# Patient Record
Sex: FEMALE | Race: WHITE | NOT HISPANIC OR LATINO | URBAN - METROPOLITAN AREA
[De-identification: names, ages, dates, MRNs, and addresses within clinical notes are randomized per-mention and may not be internally consistent; named-entity substitution may affect disease eponyms.]

---

## 2017-02-22 ENCOUNTER — OUTPATIENT (OUTPATIENT)
Dept: OUTPATIENT SERVICES | Facility: HOSPITAL | Age: 67
LOS: 1 days | End: 2017-02-22

## 2018-04-03 ENCOUNTER — OUTPATIENT (OUTPATIENT)
Dept: OUTPATIENT SERVICES | Facility: HOSPITAL | Age: 68
LOS: 1 days | End: 2018-04-03

## 2019-10-04 ENCOUNTER — APPOINTMENT (OUTPATIENT)
Dept: MAMMOGRAPHY | Facility: CLINIC | Age: 69
End: 2019-10-04
Payer: MEDICARE

## 2019-10-04 ENCOUNTER — APPOINTMENT (OUTPATIENT)
Dept: ULTRASOUND IMAGING | Facility: CLINIC | Age: 69
End: 2019-10-04

## 2019-10-04 ENCOUNTER — APPOINTMENT (OUTPATIENT)
Dept: RADIOLOGY | Facility: CLINIC | Age: 69
End: 2019-10-04

## 2019-10-04 PROCEDURE — 77067 SCR MAMMO BI INCL CAD: CPT

## 2019-10-04 PROCEDURE — 77063 BREAST TOMOSYNTHESIS BI: CPT

## 2019-10-04 PROCEDURE — 77080 DXA BONE DENSITY AXIAL: CPT

## 2019-10-04 PROCEDURE — 76641 ULTRASOUND BREAST COMPLETE: CPT | Mod: 50

## 2020-09-15 ENCOUNTER — OUTPATIENT (OUTPATIENT)
Dept: OUTPATIENT SERVICES | Facility: HOSPITAL | Age: 70
LOS: 1 days | End: 2020-09-15

## 2020-11-11 ENCOUNTER — APPOINTMENT (OUTPATIENT)
Dept: ULTRASOUND IMAGING | Facility: CLINIC | Age: 70
End: 2020-11-11

## 2020-11-11 ENCOUNTER — APPOINTMENT (OUTPATIENT)
Dept: MAMMOGRAPHY | Facility: CLINIC | Age: 70
End: 2020-11-11
Payer: MEDICARE

## 2020-11-11 PROCEDURE — 77067 SCR MAMMO BI INCL CAD: CPT

## 2020-11-11 PROCEDURE — 76641 ULTRASOUND BREAST COMPLETE: CPT | Mod: 50

## 2020-11-11 PROCEDURE — 77063 BREAST TOMOSYNTHESIS BI: CPT

## 2021-03-31 ENCOUNTER — OUTPATIENT (OUTPATIENT)
Dept: OUTPATIENT SERVICES | Facility: HOSPITAL | Age: 71
LOS: 1 days | End: 2021-03-31

## 2021-12-29 ENCOUNTER — APPOINTMENT (OUTPATIENT)
Dept: RADIOLOGY | Facility: CLINIC | Age: 71
End: 2021-12-29
Payer: MEDICARE

## 2021-12-29 ENCOUNTER — APPOINTMENT (OUTPATIENT)
Dept: MAMMOGRAPHY | Facility: CLINIC | Age: 71
End: 2021-12-29

## 2021-12-29 PROCEDURE — 77067 SCR MAMMO BI INCL CAD: CPT

## 2021-12-29 PROCEDURE — 77063 BREAST TOMOSYNTHESIS BI: CPT

## 2021-12-29 PROCEDURE — 77080 DXA BONE DENSITY AXIAL: CPT

## 2022-01-07 ENCOUNTER — APPOINTMENT (OUTPATIENT)
Dept: ULTRASOUND IMAGING | Facility: CLINIC | Age: 72
End: 2022-01-07

## 2022-01-11 PROBLEM — Z00.00 ENCOUNTER FOR PREVENTIVE HEALTH EXAMINATION: Status: ACTIVE | Noted: 2022-01-11

## 2022-01-12 ENCOUNTER — APPOINTMENT (OUTPATIENT)
Dept: ULTRASOUND IMAGING | Facility: CLINIC | Age: 72
End: 2022-01-12
Payer: COMMERCIAL

## 2022-01-12 PROCEDURE — 76641 ULTRASOUND BREAST COMPLETE: CPT | Mod: 50

## 2022-05-09 ENCOUNTER — APPOINTMENT (OUTPATIENT)
Dept: PULMONOLOGY | Facility: CLINIC | Age: 72
End: 2022-05-09
Payer: MEDICARE

## 2022-05-09 ENCOUNTER — NON-APPOINTMENT (OUTPATIENT)
Age: 72
End: 2022-05-09

## 2022-05-09 VITALS
HEIGHT: 61 IN | BODY MASS INDEX: 26.06 KG/M2 | WEIGHT: 138 LBS | TEMPERATURE: 97.3 F | HEART RATE: 72 BPM | DIASTOLIC BLOOD PRESSURE: 88 MMHG | SYSTOLIC BLOOD PRESSURE: 165 MMHG | OXYGEN SATURATION: 99 %

## 2022-05-09 PROCEDURE — 99204 OFFICE O/P NEW MOD 45 MIN: CPT

## 2022-05-09 RX ORDER — ROSUVASTATIN CALCIUM 10 MG/1
10 TABLET, FILM COATED ORAL
Qty: 90 | Refills: 0 | Status: ACTIVE | COMMUNITY
Start: 2021-11-11

## 2022-05-09 RX ORDER — OMEPRAZOLE 40 MG/1
40 CAPSULE, DELAYED RELEASE ORAL
Qty: 90 | Refills: 0 | Status: ACTIVE | COMMUNITY
Start: 2022-04-11

## 2022-05-09 RX ORDER — TRIAMCINOLONE ACETONIDE 55 UG/1
55 SOLUTION/ DROPS OPHTHALMIC
Qty: 17 | Refills: 0 | Status: ACTIVE | COMMUNITY
Start: 2021-02-11

## 2022-05-09 NOTE — DISCUSSION/SUMMARY
[FreeTextEntry1] : Ms. Strong had a CT abdomen which revealed some bibasilar nodules.  They are very nonspecific and too small for PET scan or biopsy.  I favor a CT of the chest to define the anatomy in the entirety of the lung fields and then likely a follow-up CAT scan in 6 months to ensure stability in this low risk patient with multiple nodules she has a history of a CAT scan of chest with nodule 7 years ago at [facility] and we will obtain those results..  This been discussed with the patient she understands and agrees.\par The patient understands and agrees with plan of care.\par Today's office visit encompassed 46  minutes. I conducted an extensive history ,physical exam and reviewed diagnosis and treatment options  including diagnostic tests,radiologic studies including cat scans  and the use of prescription medication.

## 2022-05-09 NOTE — HISTORY OF PRESENT ILLNESS
[Never] : never [TextBox_4] : 72 female no hx tobacco \par presents today because of lung nodules seen on ct abdomen\par feels good no sob no cough no wheeze no chest pain no tightness\par no wt loss no hemoptysis\par no hx cancer\par occ [employer] [employer] [facility] school no asbestos no chemical

## 2022-06-13 ENCOUNTER — NON-APPOINTMENT (OUTPATIENT)
Age: 72
End: 2022-06-13

## 2022-09-30 ENCOUNTER — NON-APPOINTMENT (OUTPATIENT)
Age: 72
End: 2022-09-30

## 2022-11-07 ENCOUNTER — APPOINTMENT (OUTPATIENT)
Dept: PULMONOLOGY | Facility: CLINIC | Age: 72
End: 2022-11-07

## 2022-11-07 VITALS
OXYGEN SATURATION: 98 % | TEMPERATURE: 98 F | BODY MASS INDEX: 26.06 KG/M2 | SYSTOLIC BLOOD PRESSURE: 170 MMHG | WEIGHT: 138 LBS | HEART RATE: 78 BPM | DIASTOLIC BLOOD PRESSURE: 105 MMHG | HEIGHT: 61 IN

## 2022-11-07 DIAGNOSIS — R91.8 OTHER NONSPECIFIC ABNORMAL FINDING OF LUNG FIELD: ICD-10-CM

## 2022-11-07 PROCEDURE — 99214 OFFICE O/P EST MOD 30 MIN: CPT

## 2022-11-07 RX ORDER — OLOPATADINE HCL 1 MG/ML
0.1 SOLUTION/ DROPS OPHTHALMIC
Qty: 5 | Refills: 0 | Status: ACTIVE | COMMUNITY
Start: 2022-10-05

## 2022-11-07 RX ORDER — ALPRAZOLAM 1 MG/1
1 TABLET ORAL
Qty: 2 | Refills: 0 | Status: ACTIVE | COMMUNITY
Start: 2022-10-12

## 2022-11-07 RX ORDER — UBIDECARENONE 200 MG
CAPSULE ORAL
Refills: 0 | Status: ACTIVE | COMMUNITY

## 2022-11-07 RX ORDER — VALACYCLOVIR 1 G/1
1 TABLET, FILM COATED ORAL
Qty: 4 | Refills: 0 | Status: DISCONTINUED | COMMUNITY
Start: 2021-11-11 | End: 2022-11-07

## 2022-11-07 NOTE — DISCUSSION/SUMMARY
[FreeTextEntry1] : Ms Strong  denies all pulmonary symptoms.  Her CAT scan of chest did not reveal any nodules and only one granuloma.  In a low risk patient I do not think further CAT scans are warranted.  The patient understands and agrees with this plan of care.\par The patient understands and agrees with plan of care.\par Today's office visit encompassed 32 minutes. I conducted an extensive history ,physical exam and reviewed diagnosis and treatment options  including diagnostic tests,radiologic studies including  cat scans  and the use of prescription medication.

## 2022-11-07 NOTE — HISTORY OF PRESENT ILLNESS
[Never] : never [TextBox_4] : 72 female no hx tobacco for fu re nodules\par feels good  no sob no cough no chest pain no tightness\par no hemoptysis no wt loss \par no hx cancer

## 2022-11-07 NOTE — REASON FOR VISIT
[Follow-Up] : a follow-up visit [Pulmonary Nodules] : pulmonary nodules [TextBox_44] : 6 months. CT completed. Pt has no pulmonary complaints.

## 2022-12-07 ENCOUNTER — NON-APPOINTMENT (OUTPATIENT)
Age: 72
End: 2022-12-07

## 2023-01-04 ENCOUNTER — NON-APPOINTMENT (OUTPATIENT)
Age: 73
End: 2023-01-04

## 2023-01-18 ENCOUNTER — APPOINTMENT (OUTPATIENT)
Dept: MAMMOGRAPHY | Facility: CLINIC | Age: 73
End: 2023-01-18
Payer: MEDICARE

## 2023-01-18 ENCOUNTER — APPOINTMENT (OUTPATIENT)
Dept: ULTRASOUND IMAGING | Facility: CLINIC | Age: 73
End: 2023-01-18

## 2023-01-18 PROCEDURE — 77063 BREAST TOMOSYNTHESIS BI: CPT

## 2023-01-18 PROCEDURE — 77067 SCR MAMMO BI INCL CAD: CPT

## 2023-01-18 PROCEDURE — 76641 ULTRASOUND BREAST COMPLETE: CPT | Mod: 50

## 2024-01-26 ENCOUNTER — APPOINTMENT (OUTPATIENT)
Dept: RADIOLOGY | Facility: CLINIC | Age: 74
End: 2024-01-26
Payer: MEDICARE

## 2024-01-26 ENCOUNTER — APPOINTMENT (OUTPATIENT)
Dept: MAMMOGRAPHY | Facility: CLINIC | Age: 74
End: 2024-01-26
Payer: MEDICARE

## 2024-01-26 ENCOUNTER — APPOINTMENT (OUTPATIENT)
Dept: RADIOLOGY | Facility: CLINIC | Age: 74
End: 2024-01-26

## 2024-01-26 ENCOUNTER — APPOINTMENT (OUTPATIENT)
Dept: MAMMOGRAPHY | Facility: CLINIC | Age: 74
End: 2024-01-26

## 2024-01-26 PROCEDURE — 77067 SCR MAMMO BI INCL CAD: CPT

## 2024-01-26 PROCEDURE — 77080 DXA BONE DENSITY AXIAL: CPT

## 2024-01-26 PROCEDURE — 77063 BREAST TOMOSYNTHESIS BI: CPT

## 2024-02-29 ENCOUNTER — OFFICE (OUTPATIENT)
Dept: URBAN - METROPOLITAN AREA CLINIC 105 | Facility: CLINIC | Age: 74
Setting detail: OPHTHALMOLOGY
End: 2024-02-29
Payer: COMMERCIAL

## 2024-02-29 VITALS — HEIGHT: 55 IN

## 2024-02-29 DIAGNOSIS — H52.7: ICD-10-CM

## 2024-02-29 DIAGNOSIS — H40.013: ICD-10-CM

## 2024-02-29 DIAGNOSIS — H25.13: ICD-10-CM

## 2024-02-29 PROCEDURE — 92250 FUNDUS PHOTOGRAPHY W/I&R: CPT | Performed by: STUDENT IN AN ORGANIZED HEALTH CARE EDUCATION/TRAINING PROGRAM

## 2024-02-29 PROCEDURE — 92015 DETERMINE REFRACTIVE STATE: CPT | Performed by: STUDENT IN AN ORGANIZED HEALTH CARE EDUCATION/TRAINING PROGRAM

## 2024-02-29 PROCEDURE — 92014 COMPRE OPH EXAM EST PT 1/>: CPT | Performed by: STUDENT IN AN ORGANIZED HEALTH CARE EDUCATION/TRAINING PROGRAM

## 2024-02-29 ASSESSMENT — SPHEQUIV_DERIVED
OS_SPHEQUIV: 0.125
OS_SPHEQUIV: 0.75
OD_SPHEQUIV: 0.375

## 2024-02-29 ASSESSMENT — REFRACTION_MANIFEST
OS_SPHERE: +0.75
OD_CYLINDER: -1.25
OD_SPHERE: PLANO
OS_ADD: +2.50
OD_VA1: 20/25-
OD_AXIS: 070
OS_CYLINDER: -1.25
OS_VA2: 20/20
OS_AXIS: 109
OS_VA1: 20/20
OD_CYLINDER: -1.75
OS_ADD: +3.00
OS_AXIS: 105
OD_AXIS: 077
OS_VA1: 20/25
OD_ADD: +3.00
OS_SPHERE: +1.25
OS_CYLINDER: -1.00
OD_SPHERE: +1.00
OD_VA2: 20/20
OD_ADD: +2.50
OD_VA1: 20/25

## 2024-02-29 ASSESSMENT — REFRACTION_CURRENTRX
OD_OVR_VA: 20/
OD_SPHERE: +2.00
OS_ADD: +2.25
OS_SPHERE: +2.00
OS_AXIS: 107
OS_OVR_VA: 20/
OD_CYLINDER: -2.00
OD_AXIS: 058
OS_CYLINDER: -1.00
OS_VPRISM_DIRECTION: PROGS
OD_ADD: +2.25
OD_VPRISM_DIRECTION: PROGS

## 2024-02-29 ASSESSMENT — CONFRONTATIONAL VISUAL FIELD TEST (CVF)
OS_FINDINGS: FULL
OD_FINDINGS: FULL

## 2024-10-02 ENCOUNTER — OFFICE (OUTPATIENT)
Dept: URBAN - METROPOLITAN AREA CLINIC 105 | Facility: CLINIC | Age: 74
Setting detail: OPHTHALMOLOGY
End: 2024-10-02
Payer: COMMERCIAL

## 2024-10-02 DIAGNOSIS — H40.013: ICD-10-CM

## 2024-10-02 PROCEDURE — 99213 OFFICE O/P EST LOW 20 MIN: CPT | Performed by: STUDENT IN AN ORGANIZED HEALTH CARE EDUCATION/TRAINING PROGRAM

## 2024-10-02 PROCEDURE — 92083 EXTENDED VISUAL FIELD XM: CPT | Performed by: STUDENT IN AN ORGANIZED HEALTH CARE EDUCATION/TRAINING PROGRAM

## 2024-10-02 PROCEDURE — 92133 CPTRZD OPH DX IMG PST SGM ON: CPT | Performed by: STUDENT IN AN ORGANIZED HEALTH CARE EDUCATION/TRAINING PROGRAM

## 2024-10-02 ASSESSMENT — REFRACTION_MANIFEST
OD_AXIS: 070
OS_AXIS: 109
OD_SPHERE: PLANO
OD_VA1: 20/25
OD_VA1: 20/25-
OD_CYLINDER: -1.25
OS_CYLINDER: -1.00
OS_ADD: +2.50
OS_SPHERE: +1.25
OS_VA2: 20/20
OD_AXIS: 077
OS_VA1: 20/25
OD_SPHERE: +1.00
OS_VA1: 20/20
OD_VA2: 20/20
OS_AXIS: 105
OD_CYLINDER: -1.75
OD_ADD: +3.00
OS_SPHERE: +0.75
OD_ADD: +2.50
OS_CYLINDER: -1.25
OS_ADD: +3.00

## 2024-10-02 ASSESSMENT — KERATOMETRY
OD_K2POWER_DIOPTERS: 42.50
OS_K1POWER_DIOPTERS: 42.75
OD_AXISANGLE_DEGREES: 171
OS_AXISANGLE_DEGREES: 059
OS_K2POWER_DIOPTERS: 43.00
OD_K1POWER_DIOPTERS: 41.75

## 2024-10-02 ASSESSMENT — REFRACTION_CURRENTRX
OS_CYLINDER: -2.00
OD_OVR_VA: 20/
OS_VPRISM_DIRECTION: PROGS
OD_VPRISM_DIRECTION: PROGS
OS_ADD: +2.50
OD_SPHERE: -0.25
OS_AXIS: 122
OD_CYLINDER: -1.50
OD_ADD: +2.50
OS_OVR_VA: 20/
OD_AXIS: 080
OS_SPHERE: +1.75

## 2024-10-02 ASSESSMENT — TONOMETRY
OD_IOP_MMHG: 21
OS_IOP_MMHG: 20

## 2024-10-02 ASSESSMENT — REFRACTION_AUTOREFRACTION
OS_CYLINDER: -1.25
OD_AXIS: 081
OD_CYLINDER: -1.75
OD_SPHERE: -0.25
OS_SPHERE: 0.00
OS_AXIS: 102

## 2024-10-02 ASSESSMENT — PACHYMETRY
OD_CT_UM: 570
OD_CT_CORRECTION: -2
OS_CT_UM: 560
OS_CT_CORRECTION: -1

## 2024-10-02 ASSESSMENT — CONFRONTATIONAL VISUAL FIELD TEST (CVF)
OS_FINDINGS: FULL
OD_FINDINGS: FULL

## 2024-10-02 ASSESSMENT — VISUAL ACUITY
OS_BCVA: 20/30-
OD_BCVA: 20/40-2

## 2025-01-27 ENCOUNTER — APPOINTMENT (OUTPATIENT)
Dept: MAMMOGRAPHY | Facility: CLINIC | Age: 75
End: 2025-01-27
Payer: MEDICARE

## 2025-01-27 PROCEDURE — 77067 SCR MAMMO BI INCL CAD: CPT

## 2025-01-27 PROCEDURE — 77063 BREAST TOMOSYNTHESIS BI: CPT

## 2025-04-02 ENCOUNTER — OFFICE (OUTPATIENT)
Dept: URBAN - METROPOLITAN AREA CLINIC 105 | Facility: CLINIC | Age: 75
Setting detail: OPHTHALMOLOGY
End: 2025-04-02
Payer: COMMERCIAL

## 2025-04-02 DIAGNOSIS — Z83.518: ICD-10-CM

## 2025-04-02 DIAGNOSIS — H25.13: ICD-10-CM

## 2025-04-02 DIAGNOSIS — H40.013: ICD-10-CM

## 2025-04-02 PROCEDURE — 92014 COMPRE OPH EXAM EST PT 1/>: CPT | Performed by: STUDENT IN AN ORGANIZED HEALTH CARE EDUCATION/TRAINING PROGRAM

## 2025-04-02 PROCEDURE — 92250 FUNDUS PHOTOGRAPHY W/I&R: CPT | Performed by: STUDENT IN AN ORGANIZED HEALTH CARE EDUCATION/TRAINING PROGRAM

## 2025-04-02 ASSESSMENT — REFRACTION_MANIFEST
OS_VA1: 20/20
OS_CYLINDER: -1.00
OD_CYLINDER: -1.25
OS_VA2: 20/20
OS_AXIS: 109
OD_SPHERE: PLANO
OD_AXIS: 077
OS_AXIS: 105
OS_VA1: 20/25
OD_SPHERE: +1.00
OD_VA1: 20/25-
OD_VA1: 20/25
OS_ADD: +3.00
OS_ADD: +2.50
OD_ADD: +2.50
OD_CYLINDER: -1.75
OS_SPHERE: +1.25
OD_VA2: 20/20
OS_CYLINDER: -1.25
OD_AXIS: 070
OS_SPHERE: +0.75
OD_ADD: +3.00

## 2025-04-02 ASSESSMENT — REFRACTION_CURRENTRX
OD_SPHERE: -0.25
OS_ADD: +2.50
OD_ADD: +2.50
OD_VPRISM_DIRECTION: PROGS
OD_CYLINDER: -1.50
OS_OVR_VA: 20/
OD_AXIS: 080
OS_VPRISM_DIRECTION: PROGS
OS_AXIS: 122
OS_CYLINDER: -2.00
OD_OVR_VA: 20/
OS_SPHERE: +1.75

## 2025-04-02 ASSESSMENT — CONFRONTATIONAL VISUAL FIELD TEST (CVF)
OD_FINDINGS: FULL
OS_FINDINGS: FULL

## 2025-04-02 ASSESSMENT — KERATOMETRY
OD_K2POWER_DIOPTERS: 43.00
OD_K1POWER_DIOPTERS: 41.75
OS_AXISANGLE_DEGREES: 042
OS_K1POWER_DIOPTERS: 42.25
OS_K2POWER_DIOPTERS: 43.00
OD_AXISANGLE_DEGREES: 158

## 2025-04-02 ASSESSMENT — PACHYMETRY
OD_CT_CORRECTION: -2
OS_CT_CORRECTION: -1
OD_CT_UM: 570
OS_CT_UM: 560

## 2025-04-02 ASSESSMENT — VISUAL ACUITY
OD_BCVA: 20/40-1
OS_BCVA: 20/50

## 2025-04-02 ASSESSMENT — TONOMETRY
OS_IOP_MMHG: 19
OD_IOP_MMHG: 18

## 2025-04-02 ASSESSMENT — REFRACTION_AUTOREFRACTION
OD_SPHERE: -0.25
OD_CYLINDER: -1.75
OS_CYLINDER: -1.50
OD_AXIS: 079
OS_AXIS: 111
OS_SPHERE: 0.00

## 2025-06-04 ENCOUNTER — OFFICE (OUTPATIENT)
Dept: URBAN - METROPOLITAN AREA CLINIC 105 | Facility: CLINIC | Age: 75
Setting detail: OPHTHALMOLOGY
End: 2025-06-04
Payer: COMMERCIAL

## 2025-06-04 DIAGNOSIS — H25.13: ICD-10-CM

## 2025-06-04 DIAGNOSIS — H25.11: ICD-10-CM

## 2025-06-04 PROCEDURE — 92136 OPHTHALMIC BIOMETRY: CPT | Performed by: STUDENT IN AN ORGANIZED HEALTH CARE EDUCATION/TRAINING PROGRAM

## 2025-06-04 PROCEDURE — 99213 OFFICE O/P EST LOW 20 MIN: CPT | Performed by: STUDENT IN AN ORGANIZED HEALTH CARE EDUCATION/TRAINING PROGRAM

## 2025-06-04 ASSESSMENT — REFRACTION_MANIFEST
OS_CYLINDER: -1.00
OD_CYLINDER: -1.25
OS_ADD: +2.50
OD_SPHERE: +1.00
OD_SPHERE: PLANO
OS_ADD: +3.00
OD_AXIS: 070
OS_SPHERE: +1.25
OD_AXIS: 077
OD_ADD: +2.50
OD_VA1: 20/25
OD_CYLINDER: -1.75
OD_ADD: +3.00
OD_VA1: 20/25-
OS_CYLINDER: -1.25
OS_VA2: 20/20
OS_VA1: 20/20
OS_AXIS: 109
OS_SPHERE: +0.75
OD_VA2: 20/20
OS_AXIS: 105
OS_VA1: 20/25

## 2025-06-04 ASSESSMENT — KERATOMETRY
OD_AXISANGLE_DEGREES: 158
OS_K1POWER_DIOPTERS: 42.25
OD_AXISANGLE_DEGREES: 68
OS_K1K2_AVERAGE: 42.625
OS_CYLAXISANGLE_DEGREES: 042
OD_K2POWER_DIOPTERS: 43.00
OS_K1POWER_DIOPTERS: 42.25
OS_AXISANGLE_DEGREES: 042
OS_K2POWER_DIOPTERS: 43.00
OS_K2POWER_DIOPTERS: 43.00
OS_AXISANGLE_DEGREES: 132
OD_CYLAXISANGLE_DEGREES: 158
OD_K1POWER_DIOPTERS: 41.75
OS_CYLPOWER_DEGREES: 0.75
OD_K2POWER_DIOPTERS: 43.00
OD_AXISANGLE2_DEGREES: 158
OD_CYLPOWER_DEGREES: 1.25
OS_AXISANGLE2_DEGREES: 042
OD_K1K2_AVERAGE: 42.375
OD_K1POWER_DIOPTERS: 41.75

## 2025-06-04 ASSESSMENT — PACHYMETRY
OS_CT_UM: 560
OD_CT_CORRECTION: -2
OS_CT_CORRECTION: -1
OD_CT_UM: 570

## 2025-06-04 ASSESSMENT — REFRACTION_AUTOREFRACTION
OS_SPHERE: 0.00
OD_AXIS: 079
OS_CYLINDER: -1.50
OD_SPHERE: -0.25
OS_AXIS: 111
OD_CYLINDER: -1.75

## 2025-06-04 ASSESSMENT — VISUAL ACUITY
OS_BCVA: 20/70
OD_BCVA: 20/50-

## 2025-06-04 ASSESSMENT — REFRACTION_CURRENTRX
OD_VPRISM_DIRECTION: PROGS
OS_AXIS: 122
OD_SPHERE: -0.25
OS_ADD: +2.50
OS_VPRISM_DIRECTION: PROGS
OS_SPHERE: +1.75
OD_ADD: +2.50
OS_OVR_VA: 20/
OD_CYLINDER: -1.50
OD_AXIS: 080
OD_OVR_VA: 20/
OS_CYLINDER: -2.00

## 2025-06-04 ASSESSMENT — TONOMETRY
OD_IOP_MMHG: 18
OS_IOP_MMHG: 18

## 2025-06-04 ASSESSMENT — CONFRONTATIONAL VISUAL FIELD TEST (CVF)
OS_FINDINGS: FULL
OD_FINDINGS: FULL

## 2025-06-17 ENCOUNTER — AMBULATORY SURGERY CENTER (OUTPATIENT)
Dept: URBAN - METROPOLITAN AREA SURGERY 4 | Facility: SURGERY | Age: 75
Setting detail: OPHTHALMOLOGY
End: 2025-06-17
Payer: COMMERCIAL

## 2025-06-17 DIAGNOSIS — H25.11: ICD-10-CM

## 2025-06-17 DIAGNOSIS — H52.221: ICD-10-CM

## 2025-06-17 PROCEDURE — 66984 XCAPSL CTRC RMVL W/O ECP: CPT | Mod: RT | Performed by: STUDENT IN AN ORGANIZED HEALTH CARE EDUCATION/TRAINING PROGRAM

## 2025-06-17 PROCEDURE — FEMTO PRECISION LASER CATARACT SURGERY: Mod: GY | Performed by: STUDENT IN AN ORGANIZED HEALTH CARE EDUCATION/TRAINING PROGRAM

## 2025-06-18 ENCOUNTER — RX ONLY (RX ONLY)
Age: 75
End: 2025-06-18

## 2025-06-18 ENCOUNTER — OFFICE (OUTPATIENT)
Dept: URBAN - METROPOLITAN AREA CLINIC 105 | Facility: CLINIC | Age: 75
Setting detail: OPHTHALMOLOGY
End: 2025-06-18
Payer: COMMERCIAL

## 2025-06-18 DIAGNOSIS — Z96.1: ICD-10-CM

## 2025-06-18 PROCEDURE — 99024 POSTOP FOLLOW-UP VISIT: CPT | Performed by: STUDENT IN AN ORGANIZED HEALTH CARE EDUCATION/TRAINING PROGRAM

## 2025-06-18 ASSESSMENT — REFRACTION_MANIFEST
OS_ADD: +2.50
OD_VA1: 20/25-
OS_ADD: +3.00
OS_AXIS: 105
OS_VA2: 20/20
OD_VA1: 20/25
OS_CYLINDER: -1.25
OS_VA1: 20/20
OD_AXIS: 070
OD_SPHERE: +1.00
OS_SPHERE: +1.25
OD_CYLINDER: -1.25
OS_SPHERE: +0.75
OD_CYLINDER: -1.75
OD_AXIS: 077
OS_CYLINDER: -1.00
OS_AXIS: 109
OD_ADD: +3.00
OD_SPHERE: PLANO
OD_ADD: +2.50
OS_VA1: 20/25
OD_VA2: 20/20

## 2025-06-18 ASSESSMENT — CONFRONTATIONAL VISUAL FIELD TEST (CVF)
OS_FINDINGS: FULL
OD_FINDINGS: FULL

## 2025-06-18 ASSESSMENT — REFRACTION_CURRENTRX
OD_VPRISM_DIRECTION: PROGS
OS_AXIS: 122
OS_CYLINDER: -2.00
OD_ADD: +2.50
OD_SPHERE: -0.25
OS_ADD: +2.50
OS_OVR_VA: 20/
OD_CYLINDER: -1.50
OS_SPHERE: +1.75
OD_OVR_VA: 20/
OS_VPRISM_DIRECTION: PROGS
OD_AXIS: 080

## 2025-06-18 ASSESSMENT — KERATOMETRY
OD_K2POWER_DIOPTERS: 43.50
OS_K1POWER_DIOPTERS: 42.50
OS_AXISANGLE_DEGREES: 072
OD_K1POWER_DIOPTERS: 42.50
OS_K2POWER_DIOPTERS: 43.00
OD_AXISANGLE_DEGREES: 149

## 2025-06-18 ASSESSMENT — REFRACTION_AUTOREFRACTION
OS_SPHERE: -0.75
OD_AXIS: 084
OD_CYLINDER: -2.25
OS_AXIS: 114
OS_CYLINDER: -1.00
OD_SPHERE: +1.75

## 2025-06-18 ASSESSMENT — PACHYMETRY
OD_CT_CORRECTION: -2
OD_CT_UM: 570
OS_CT_UM: 560
OS_CT_CORRECTION: -1

## 2025-06-18 ASSESSMENT — CORNEAL EDEMA CLINICAL DESCRIPTION: OD_CORNEALEDEMA: 1+ 2+

## 2025-06-18 ASSESSMENT — VISUAL ACUITY
OS_BCVA: 20/50+
OD_BCVA: 20/50-

## 2025-06-25 ENCOUNTER — OFFICE (OUTPATIENT)
Dept: URBAN - METROPOLITAN AREA CLINIC 105 | Facility: CLINIC | Age: 75
Setting detail: OPHTHALMOLOGY
End: 2025-06-25
Payer: COMMERCIAL

## 2025-06-25 DIAGNOSIS — Z96.1: ICD-10-CM

## 2025-06-25 DIAGNOSIS — H25.12: ICD-10-CM

## 2025-06-25 PROCEDURE — 99024 POSTOP FOLLOW-UP VISIT: CPT | Performed by: STUDENT IN AN ORGANIZED HEALTH CARE EDUCATION/TRAINING PROGRAM

## 2025-06-25 PROCEDURE — 92136 OPHTHALMIC BIOMETRY: CPT | Performed by: STUDENT IN AN ORGANIZED HEALTH CARE EDUCATION/TRAINING PROGRAM

## 2025-06-25 ASSESSMENT — REFRACTION_CURRENTRX
OD_AXIS: 080
OD_OVR_VA: 20/
OS_AXIS: 122
OD_VPRISM_DIRECTION: PROGS
OD_SPHERE: -0.25
OD_ADD: +2.50
OS_CYLINDER: -2.00
OS_OVR_VA: 20/
OS_SPHERE: +1.75
OD_CYLINDER: -1.50
OS_ADD: +2.50
OS_VPRISM_DIRECTION: PROGS

## 2025-06-25 ASSESSMENT — PACHYMETRY
OD_CT_CORRECTION: -2
OD_CT_UM: 570
OS_CT_CORRECTION: -1
OS_CT_UM: 560

## 2025-06-25 ASSESSMENT — REFRACTION_MANIFEST
OD_CYLINDER: -1.25
OD_AXIS: 077
OS_VA2: 20/20
OS_AXIS: 109
OD_VA1: 20/25
OD_ADD: +2.50
OS_SPHERE: +1.25
OD_VA1: 20/25-
OD_VA2: 20/20
OD_SPHERE: +1.00
OD_ADD: +3.00
OS_ADD: +3.00
OD_CYLINDER: -1.75
OS_SPHERE: +0.75
OD_SPHERE: PLANO
OS_AXIS: 105
OS_CYLINDER: -1.25
OS_VA1: 20/20
OS_ADD: +2.50
OD_AXIS: 070
OS_CYLINDER: -1.00
OS_VA1: 20/25

## 2025-06-25 ASSESSMENT — REFRACTION_AUTOREFRACTION
OD_CYLINDER: -1.25
OD_SPHERE: ++1.00
OS_SPHERE: -0.25
OS_CYLINDER: -1.75
OD_AXIS: 073
OS_AXIS: 102

## 2025-06-25 ASSESSMENT — CORNEAL EDEMA CLINICAL DESCRIPTION: OD_CORNEALEDEMA: T 1+

## 2025-06-25 ASSESSMENT — KERATOMETRY
OD_K2POWER_DIOPTERS: 43.50
OD_AXISANGLE_DEGREES: 149
OS_K2POWER_DIOPTERS: 43.00
OD_K1POWER_DIOPTERS: 42.50
OS_AXISANGLE_DEGREES: 072
OS_K1POWER_DIOPTERS: 42.50

## 2025-06-25 ASSESSMENT — CONFRONTATIONAL VISUAL FIELD TEST (CVF)
OS_FINDINGS: FULL
OD_FINDINGS: FULL

## 2025-06-25 ASSESSMENT — VISUAL ACUITY
OD_BCVA: 20/50-
OS_BCVA: 20/25-

## 2025-06-25 ASSESSMENT — TONOMETRY: OD_IOP_MMHG: 15

## 2025-07-01 ENCOUNTER — AMBULATORY SURGERY CENTER (OUTPATIENT)
Dept: URBAN - METROPOLITAN AREA SURGERY 4 | Facility: SURGERY | Age: 75
Setting detail: OPHTHALMOLOGY
End: 2025-07-01
Payer: COMMERCIAL

## 2025-07-01 DIAGNOSIS — H52.222: ICD-10-CM

## 2025-07-01 DIAGNOSIS — H25.12: ICD-10-CM

## 2025-07-01 PROCEDURE — FEMTO PRECISION LASER CATARACT SURGERY: Mod: GY | Performed by: STUDENT IN AN ORGANIZED HEALTH CARE EDUCATION/TRAINING PROGRAM

## 2025-07-01 PROCEDURE — 66984 XCAPSL CTRC RMVL W/O ECP: CPT | Mod: 79,LT | Performed by: STUDENT IN AN ORGANIZED HEALTH CARE EDUCATION/TRAINING PROGRAM

## 2025-07-02 ENCOUNTER — OFFICE (OUTPATIENT)
Dept: URBAN - METROPOLITAN AREA CLINIC 105 | Facility: CLINIC | Age: 75
Setting detail: OPHTHALMOLOGY
End: 2025-07-02
Payer: COMMERCIAL

## 2025-07-02 ENCOUNTER — RX ONLY (RX ONLY)
Age: 75
End: 2025-07-02

## 2025-07-02 DIAGNOSIS — Z96.1: ICD-10-CM

## 2025-07-02 PROBLEM — H25.12 CATARACT SENILE NUCLEAR SCLEROSIS;  , LEFT EYE: Status: RESOLVED | Noted: 2025-06-18 | Resolved: 2025-07-02

## 2025-07-02 PROCEDURE — 99024 POSTOP FOLLOW-UP VISIT: CPT | Performed by: STUDENT IN AN ORGANIZED HEALTH CARE EDUCATION/TRAINING PROGRAM

## 2025-07-02 ASSESSMENT — KERATOMETRY
OD_K2POWER_DIOPTERS: 43.00
OS_K2POWER_DIOPTERS: 43.50
OD_K1POWER_DIOPTERS: 42.25
OS_AXISANGLE_DEGREES: 109
OD_AXISANGLE_DEGREES: 128
OS_K1POWER_DIOPTERS: 43.25

## 2025-07-02 ASSESSMENT — REFRACTION_AUTOREFRACTION
OS_AXIS: 083
OS_CYLINDER: -1.00
OD_SPHERE: +0.75
OS_SPHERE: +0.50
OD_CYLINDER: -1.25
OD_AXIS: 069

## 2025-07-02 ASSESSMENT — TONOMETRY: OS_IOP_MMHG: 19

## 2025-07-02 ASSESSMENT — VISUAL ACUITY
OD_BCVA: 20/40-2
OS_BCVA: 20/20-

## 2025-07-02 ASSESSMENT — CORNEAL EDEMA CLINICAL DESCRIPTION
OD_CORNEALEDEMA: T
OS_CORNEALEDEMA: 1+ 2+

## 2025-07-02 ASSESSMENT — PACHYMETRY
OD_CT_CORRECTION: -2
OD_CT_UM: 570
OS_CT_CORRECTION: -1
OS_CT_UM: 560

## 2025-07-02 ASSESSMENT — REFRACTION_MANIFEST
OS_ADD: +2.50
OD_VA1: 20/25-
OS_VA2: 20/20
OS_CYLINDER: -1.00
OD_ADD: +3.00
OD_VA2: 20/20
OD_SPHERE: PLANO
OS_SPHERE: +0.75
OS_VA1: 20/25
OS_CYLINDER: -1.25
OD_AXIS: 077
OS_VA1: 20/20
OS_AXIS: 109
OS_ADD: +3.00
OS_AXIS: 105
OD_CYLINDER: -1.75
OD_VA1: 20/25
OD_ADD: +2.50
OD_AXIS: 070
OD_SPHERE: +1.00
OS_SPHERE: +1.25
OD_CYLINDER: -1.25

## 2025-07-02 ASSESSMENT — REFRACTION_CURRENTRX
OS_SPHERE: +1.75
OS_ADD: +2.50
OD_SPHERE: -0.25
OD_OVR_VA: 20/
OD_CYLINDER: -1.50
OS_CYLINDER: -2.00
OS_OVR_VA: 20/
OD_AXIS: 080
OD_VPRISM_DIRECTION: PROGS
OS_AXIS: 122
OS_VPRISM_DIRECTION: PROGS
OD_ADD: +2.50

## 2025-07-02 ASSESSMENT — CONFRONTATIONAL VISUAL FIELD TEST (CVF)
OD_FINDINGS: FULL
OS_FINDINGS: FULL

## 2025-07-08 ENCOUNTER — OFFICE (OUTPATIENT)
Dept: URBAN - METROPOLITAN AREA CLINIC 105 | Facility: CLINIC | Age: 75
Setting detail: OPHTHALMOLOGY
End: 2025-07-08
Payer: COMMERCIAL

## 2025-07-08 DIAGNOSIS — Z96.1: ICD-10-CM

## 2025-07-08 PROCEDURE — 99024 POSTOP FOLLOW-UP VISIT: CPT | Performed by: STUDENT IN AN ORGANIZED HEALTH CARE EDUCATION/TRAINING PROGRAM

## 2025-07-08 ASSESSMENT — REFRACTION_CURRENTRX
OS_AXIS: 122
OS_CYLINDER: -2.00
OS_OVR_VA: 20/
OS_VPRISM_DIRECTION: PROGS
OD_AXIS: 080
OD_ADD: +2.50
OD_OVR_VA: 20/
OS_SPHERE: +1.75
OD_SPHERE: -0.25
OS_ADD: +2.50
OD_CYLINDER: -1.50
OD_VPRISM_DIRECTION: PROGS

## 2025-07-08 ASSESSMENT — CORNEAL EDEMA CLINICAL DESCRIPTION
OD_CORNEALEDEMA: T
OS_CORNEALEDEMA: T 1+

## 2025-07-08 ASSESSMENT — PACHYMETRY
OD_CT_UM: 570
OS_CT_UM: 560
OS_CT_CORRECTION: -1
OD_CT_CORRECTION: -2

## 2025-07-08 ASSESSMENT — VISUAL ACUITY
OD_BCVA: 20/30-
OS_BCVA: 20/20-1

## 2025-07-08 ASSESSMENT — CONFRONTATIONAL VISUAL FIELD TEST (CVF)
OD_FINDINGS: FULL
OS_FINDINGS: FULL

## 2025-07-08 ASSESSMENT — REFRACTION_MANIFEST
OS_VA1: 20/20
OD_SPHERE: PLANO
OD_VA2: 20/20
OS_SPHERE: +0.75
OD_VA1: 20/25
OS_VA2: 20/20
OD_AXIS: 070
OS_ADD: +2.50
OS_AXIS: 109
OS_VA1: 20/25
OD_CYLINDER: -1.75
OD_SPHERE: +1.00
OD_AXIS: 077
OS_AXIS: 105
OS_CYLINDER: -1.00
OD_CYLINDER: -1.25
OD_ADD: +2.50
OD_VA1: 20/25-
OS_ADD: +3.00
OS_SPHERE: +1.25
OS_CYLINDER: -1.25
OD_ADD: +3.00

## 2025-07-08 ASSESSMENT — REFRACTION_AUTOREFRACTION
OS_AXIS: 088
OD_SPHERE: +1.25
OD_CYLINDER: -1.00
OS_CYLINDER: -1.25
OD_AXIS: 084
OS_SPHERE: +0.50

## 2025-07-08 ASSESSMENT — KERATOMETRY
OD_K1POWER_DIOPTERS: 42.25
OS_K2POWER_DIOPTERS: 43.50
OS_AXISANGLE_DEGREES: 023
OD_AXISANGLE_DEGREES: 144
OD_K2POWER_DIOPTERS: 43.00
OS_K1POWER_DIOPTERS: 42.75

## 2025-07-08 ASSESSMENT — TONOMETRY: OS_IOP_MMHG: 15

## 2025-08-04 ENCOUNTER — APPOINTMENT (OUTPATIENT)
Dept: MRI IMAGING | Facility: CLINIC | Age: 75
End: 2025-08-04

## 2025-08-12 ENCOUNTER — OFFICE (OUTPATIENT)
Dept: URBAN - METROPOLITAN AREA CLINIC 105 | Facility: CLINIC | Age: 75
Setting detail: OPHTHALMOLOGY
End: 2025-08-12
Payer: COMMERCIAL

## 2025-08-12 DIAGNOSIS — Z96.1: ICD-10-CM

## 2025-08-12 PROCEDURE — 99024 POSTOP FOLLOW-UP VISIT: CPT | Performed by: STUDENT IN AN ORGANIZED HEALTH CARE EDUCATION/TRAINING PROGRAM

## 2025-08-12 ASSESSMENT — REFRACTION_MANIFEST
OD_VA1: 20/25-
OS_CYLINDER: -1.00
OD_VA2: 20/20
OS_ADD: +2.50
OD_ADD: +3.00
OS_ADD: +3.00
OS_CYLINDER: -1.25
OS_SPHERE: +1.25
OD_VA1: 20/25
OD_CYLINDER: -1.75
OD_ADD: +2.50
OS_VA2: 20/20
OD_SPHERE: PLANO
OS_VA1: 20/20
OS_AXIS: 109
OS_VA1: 20/25
OD_AXIS: 070
OS_AXIS: 105
OS_SPHERE: +0.75
OD_SPHERE: +1.00
OD_AXIS: 077
OD_CYLINDER: -1.25

## 2025-08-12 ASSESSMENT — REFRACTION_AUTOREFRACTION
OS_SPHERE: +0.25
OD_AXIS: 082
OD_CYLINDER: -1.25
OD_SPHERE: +1.25
OS_AXIS: 094
OS_CYLINDER: -0.75

## 2025-08-12 ASSESSMENT — REFRACTION_CURRENTRX
OD_SPHERE: -0.25
OD_CYLINDER: -1.50
OD_AXIS: 080
OS_ADD: +2.50
OD_VPRISM_DIRECTION: PROGS
OD_OVR_VA: 20/
OS_CYLINDER: -2.00
OS_SPHERE: +1.75
OD_ADD: +2.50
OS_OVR_VA: 20/
OS_VPRISM_DIRECTION: PROGS
OS_AXIS: 122

## 2025-08-12 ASSESSMENT — VISUAL ACUITY
OD_BCVA: 20/20
OS_BCVA: 20/25

## 2025-08-12 ASSESSMENT — PACHYMETRY
OD_CT_UM: 570
OD_CT_CORRECTION: -2
OS_CT_CORRECTION: -1
OS_CT_UM: 560

## 2025-08-12 ASSESSMENT — KERATOMETRY
OS_K1POWER_DIOPTERS: 43.00
OS_K2POWER_DIOPTERS: 43.75
OD_K2POWER_DIOPTERS: 43.00
OD_K1POWER_DIOPTERS: 42.25
OD_AXISANGLE_DEGREES: 156
OS_AXISANGLE_DEGREES: 079

## 2025-08-12 ASSESSMENT — CONFRONTATIONAL VISUAL FIELD TEST (CVF)
OS_FINDINGS: FULL
OD_FINDINGS: FULL

## 2025-08-12 ASSESSMENT — TONOMETRY
OS_IOP_MMHG: 15
OD_IOP_MMHG: 16